# Patient Record
Sex: FEMALE
[De-identification: names, ages, dates, MRNs, and addresses within clinical notes are randomized per-mention and may not be internally consistent; named-entity substitution may affect disease eponyms.]

---

## 2018-08-07 ENCOUNTER — FORM ENCOUNTER (OUTPATIENT)
Age: 48
End: 2018-08-07

## 2018-08-07 PROBLEM — Z00.00 ENCOUNTER FOR PREVENTIVE HEALTH EXAMINATION: Status: ACTIVE | Noted: 2018-08-07

## 2018-08-08 ENCOUNTER — OUTPATIENT (OUTPATIENT)
Dept: OUTPATIENT SERVICES | Facility: HOSPITAL | Age: 48
LOS: 1 days | End: 2018-08-08
Payer: MEDICAID

## 2018-08-08 ENCOUNTER — APPOINTMENT (OUTPATIENT)
Dept: RADIOLOGY | Facility: CLINIC | Age: 48
End: 2018-08-08

## 2018-08-08 ENCOUNTER — APPOINTMENT (OUTPATIENT)
Dept: ORTHOPEDIC SURGERY | Facility: CLINIC | Age: 48
End: 2018-08-08
Payer: MEDICAID

## 2018-08-08 VITALS — RESPIRATION RATE: 16 BRPM | HEIGHT: 67 IN | WEIGHT: 158 LBS | BODY MASS INDEX: 24.8 KG/M2

## 2018-08-08 DIAGNOSIS — Z86.39 PERSONAL HISTORY OF OTHER ENDOCRINE, NUTRITIONAL AND METABOLIC DISEASE: ICD-10-CM

## 2018-08-08 DIAGNOSIS — Z87.891 PERSONAL HISTORY OF NICOTINE DEPENDENCE: ICD-10-CM

## 2018-08-08 DIAGNOSIS — Z80.9 FAMILY HISTORY OF MALIGNANT NEOPLASM, UNSPECIFIED: ICD-10-CM

## 2018-08-08 DIAGNOSIS — M20.12 HALLUX VALGUS (ACQUIRED), RIGHT FOOT: ICD-10-CM

## 2018-08-08 DIAGNOSIS — E55.9 VITAMIN D DEFICIENCY, UNSPECIFIED: ICD-10-CM

## 2018-08-08 DIAGNOSIS — M24.572 CONTRACTURE, LEFT ANKLE: ICD-10-CM

## 2018-08-08 DIAGNOSIS — M24.571 CONTRACTURE, RIGHT ANKLE: ICD-10-CM

## 2018-08-08 DIAGNOSIS — M20.11 HALLUX VALGUS (ACQUIRED), RIGHT FOOT: ICD-10-CM

## 2018-08-08 DIAGNOSIS — S92.514A NONDISPLACED FRACTURE OF PROXIMAL PHALANX OF RIGHT LESSER TOE(S), INITIAL ENCOUNTER FOR CLOSED FRACTURE: ICD-10-CM

## 2018-08-08 PROCEDURE — 99204 OFFICE O/P NEW MOD 45 MIN: CPT | Mod: 57

## 2018-08-08 PROCEDURE — 28510 TREATMENT OF TOE FRACTURE: CPT | Mod: RT

## 2018-08-08 PROCEDURE — 73630 X-RAY EXAM OF FOOT: CPT

## 2018-08-08 PROCEDURE — 73630 X-RAY EXAM OF FOOT: CPT | Mod: 26,RT

## 2018-08-08 RX ORDER — BETAMETHASONE VALERATE 1.2 MG/G
0.1 OINTMENT TOPICAL
Qty: 45 | Refills: 0 | Status: ACTIVE | COMMUNITY
Start: 2017-06-26

## 2018-08-08 RX ORDER — CHOLECALCIFEROL (VITAMIN D3) 1250 MCG
1.25 MG CAPSULE ORAL
Qty: 10 | Refills: 0 | Status: ACTIVE | COMMUNITY
Start: 2018-08-08 | End: 1900-01-01

## 2018-08-08 RX ORDER — METFORMIN ER 500 MG 500 MG/1
500 TABLET ORAL
Qty: 30 | Refills: 0 | Status: ACTIVE | COMMUNITY
Start: 2018-07-31

## 2018-10-11 ENCOUNTER — RX RENEWAL (OUTPATIENT)
Age: 48
End: 2018-10-11

## 2018-12-03 PROBLEM — M20.11 HALLUX VALGUS, BILATERAL: Status: ACTIVE | Noted: 2018-08-08

## 2024-08-27 ENCOUNTER — APPOINTMENT (OUTPATIENT)
Dept: ORTHOPEDIC SURGERY | Facility: CLINIC | Age: 54
End: 2024-08-27
Payer: MEDICAID

## 2024-08-27 VITALS
OXYGEN SATURATION: 99 % | HEIGHT: 67 IN | WEIGHT: 170 LBS | HEART RATE: 110 BPM | SYSTOLIC BLOOD PRESSURE: 184 MMHG | DIASTOLIC BLOOD PRESSURE: 118 MMHG | BODY MASS INDEX: 26.68 KG/M2

## 2024-08-27 DIAGNOSIS — M54.16 RADICULOPATHY, LUMBAR REGION: ICD-10-CM

## 2024-08-27 PROCEDURE — 72110 X-RAY EXAM L-2 SPINE 4/>VWS: CPT

## 2024-08-27 PROCEDURE — 99204 OFFICE O/P NEW MOD 45 MIN: CPT | Mod: 25

## 2024-08-27 RX ORDER — CYCLOBENZAPRINE HYDROCHLORIDE 5 MG/1
5 TABLET, FILM COATED ORAL
Qty: 30 | Refills: 0 | Status: ACTIVE | COMMUNITY
Start: 2024-08-27 | End: 1900-01-01

## 2024-08-27 RX ORDER — MELOXICAM 15 MG/1
15 TABLET ORAL DAILY
Qty: 30 | Refills: 0 | Status: ACTIVE | COMMUNITY
Start: 2024-08-27 | End: 1900-01-01

## 2024-08-29 ENCOUNTER — NON-APPOINTMENT (OUTPATIENT)
Age: 54
End: 2024-08-29

## 2024-08-29 RX ORDER — NABUMETONE 750 MG/1
750 TABLET, FILM COATED ORAL TWICE DAILY
Qty: 30 | Refills: 1 | Status: ACTIVE | COMMUNITY
Start: 2024-08-29 | End: 1900-01-01

## 2024-08-29 NOTE — PHYSICAL EXAM
[de-identified] : General: Well-nourished, well-developed, alert, and in no acute distress. Head: Normocephalic. Eyes: Pupils equal, extraocular muscles intact, normal sclera. Nose: No nasal discharge. Cardiovascular: Extremities are warm and well perfused. Distal pulses are symmetric bilaterally. Respiratory: No labored breathing. Extremities: Sensation is intact distally bilaterally. Distal pulses are symmetric bilaterally Lymphatic: No regional lymphadenopathy, no lymphedema Neurologic: No focal deficits Skin: Normal skin color, texture, and turgor Psychiatric: Normal affect  MSK: Examination of the Lumbar Spine: Gait normal Ambulating Able to toe walk, heel walk, tandem walk AROM: forward flexion limited due to pain, extension full and pain-free Tender to palpation: left paraspinals Nontender to palpation: midline, right paraspinals, SI jt, GTB, piriformis, gluteals   Lumbar Facet Loading [negative]     Right hip Range of Motion: Internal rotation: [25] degrees, External rotation: [80] degrees, Flexion [120] degrees     Log roll negative IRINA negative FADIR negative   SLR negative. Tight Hamstrings Piriformis compression negative ASIS distraction negative Iliac compression negative   Left hip:   Range of Motion: Internal rotation: [25] degrees, External rotation: [80] degrees, Flexion [120] degrees   Special tests: IRINA negative FADIR negative  SLR pain left buttocks. Tight Hamstrings Piriformis compression negative ASIS distraction negative Iliac compression negative     Sensation is intact to light touch over the superficial and deep peroneal nerve distributions and the posterior tibial nerve distribution. Capillary refill is less than two seconds. Posterior tibial and dorsalis pedis pulses 2+ equal bilaterally. No calf swelling or tenderness bilaterally. Strength testing shows Hip flexion 5/5, Hip adduction 5/5, Hip abduction 5/5, Knee Extension 5/5, Knee Flexion 5/5, dorsiflexion 5/5, plantar flexion 5/5, EHL 5/5 Reflexes: Patellar 2+, Achilles 2+. Babinski negative.      [de-identified] : Date: 08/27/2024 [place] [facility] [place]:  L-Spine Impression: There is no evidence of acute fracture.  Multilevel facet arthrosis.  Mild L2 retrolisthesis.  Multilevel anterior vertebral heights.  T12-L1 bridging osteophytes.

## 2024-08-29 NOTE — DISCUSSION/SUMMARY

## 2024-08-29 NOTE — DISCUSSION/SUMMARY
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
## 2024-08-29 NOTE — PHYSICAL EXAM
[de-identified] : General: Well-nourished, well-developed, alert, and in no acute distress. Head: Normocephalic. Eyes: Pupils equal, extraocular muscles intact, normal sclera. Nose: No nasal discharge. Cardiovascular: Extremities are warm and well perfused. Distal pulses are symmetric bilaterally. Respiratory: No labored breathing. Extremities: Sensation is intact distally bilaterally. Distal pulses are symmetric bilaterally Lymphatic: No regional lymphadenopathy, no lymphedema Neurologic: No focal deficits Skin: Normal skin color, texture, and turgor Psychiatric: Normal affect  MSK: Examination of the Lumbar Spine: Gait normal Ambulating Able to toe walk, heel walk, tandem walk AROM: forward flexion limited due to pain, extension full and pain-free Tender to palpation: left paraspinals Nontender to palpation: midline, right paraspinals, SI jt, GTB, piriformis, gluteals   Lumbar Facet Loading [negative]     Right hip Range of Motion: Internal rotation: [25] degrees, External rotation: [80] degrees, Flexion [120] degrees     Log roll negative IRINA negative FADIR negative   SLR negative. Tight Hamstrings Piriformis compression negative ASIS distraction negative Iliac compression negative   Left hip:   Range of Motion: Internal rotation: [25] degrees, External rotation: [80] degrees, Flexion [120] degrees   Special tests: IRINA negative FADIR negative  SLR pain left buttocks. Tight Hamstrings Piriformis compression negative ASIS distraction negative Iliac compression negative     Sensation is intact to light touch over the superficial and deep peroneal nerve distributions and the posterior tibial nerve distribution. Capillary refill is less than two seconds. Posterior tibial and dorsalis pedis pulses 2+ equal bilaterally. No calf swelling or tenderness bilaterally. Strength testing shows Hip flexion 5/5, Hip adduction 5/5, Hip abduction 5/5, Knee Extension 5/5, Knee Flexion 5/5, dorsiflexion 5/5, plantar flexion 5/5, EHL 5/5 Reflexes: Patellar 2+, Achilles 2+. Babinski negative.      [de-identified] : [place]24 [place] [facility] [place]  L-Spine Impression: There is no evidence of acute fracture.  Multilevel facet arthrosis.  Mild L2 retrolisthesis.  Multilevel anterior vertebral heights.  T12-L1 bridging osteophytes.

## 2024-08-29 NOTE — PHYSICAL EXAM
[de-identified] : General: Well-nourished, well-developed, alert, and in no acute distress. Head: Normocephalic. Eyes: Pupils equal, extraocular muscles intact, normal sclera. Nose: No nasal discharge. Cardiovascular: Extremities are warm and well perfused. Distal pulses are symmetric bilaterally. Respiratory: No labored breathing. Extremities: Sensation is intact distally bilaterally. Distal pulses are symmetric bilaterally Lymphatic: No regional lymphadenopathy, no lymphedema Neurologic: No focal deficits Skin: Normal skin color, texture, and turgor Psychiatric: Normal affect  MSK: Examination of the Lumbar Spine: Gait normal Ambulating Able to toe walk, heel walk, tandem walk AROM: forward flexion limited due to pain, extension full and pain-free Tender to palpation: left paraspinals Nontender to palpation: midline, right paraspinals, SI jt, GTB, piriformis, gluteals   Lumbar Facet Loading [negative]     Right hip Range of Motion: Internal rotation: [25] degrees, External rotation: [80] degrees, Flexion [120] degrees     Log roll negative IRINA negative FADIR negative   SLR negative. Tight Hamstrings Piriformis compression negative ASIS distraction negative Iliac compression negative   Left hip:   Range of Motion: Internal rotation: [25] degrees, External rotation: [80] degrees, Flexion [120] degrees   Special tests: IRINA negative FADIR negative  SLR pain left buttocks. Tight Hamstrings Piriformis compression negative ASIS distraction negative Iliac compression negative     Sensation is intact to light touch over the superficial and deep peroneal nerve distributions and the posterior tibial nerve distribution. Capillary refill is less than two seconds. Posterior tibial and dorsalis pedis pulses 2+ equal bilaterally. No calf swelling or tenderness bilaterally. Strength testing shows Hip flexion 5/5, Hip adduction 5/5, Hip abduction 5/5, Knee Extension 5/5, Knee Flexion 5/5, dorsiflexion 5/5, plantar flexion 5/5, EHL 5/5 Reflexes: Patellar 2+, Achilles 2+. Babinski negative.      [de-identified] : Date: 08/27/2024 Location: [facility] – [place] Body part:  L-Spine Impression: There is no evidence of acute fracture.  Multilevel facet arthrosis.  Mild L2 retrolisthesis.  Multilevel anterior vertebral heights.  T12-L1 bridging osteophytes.

## 2024-08-29 NOTE — ASSESSMENT
[FreeTextEntry1] : ROSY VENEGAS is a 54 year old female with low back pain. I discussed with the patient that their symptoms, signs, and imaging are most consistent with lumbar radicular pain. We reviewed the natural history of this condition and treatment options. We agreed on the following plan:   XR L spine as detailed above. Activity modification: low impact aerobic activity (stationary bike, elliptical, swimming). Recommend 150 min of moderate intensity aerobic activity per week. Start Home Exercises for spine conditioning. Demonstration, resistance bands and handout provided. Physical therapy. Referral provided. Medication: Meloxicam prn and Cyclobenzaprine prn prescription provided. Apply heat to low back when supine and knees flexed at 90 deg. Demonstrated provided. Advanced imaging: consider MRI if no symptomatic improvement. Follow up in 6-8 weeks.

## 2024-08-29 NOTE — DISCUSSION/SUMMARY

## 2024-11-08 ENCOUNTER — OFFICE (OUTPATIENT)
Dept: URBAN - METROPOLITAN AREA CLINIC 92 | Facility: CLINIC | Age: 54
Setting detail: OPHTHALMOLOGY
End: 2024-11-08
Payer: COMMERCIAL

## 2024-11-08 ENCOUNTER — RX ONLY (RX ONLY)
Age: 54
End: 2024-11-08

## 2024-11-08 DIAGNOSIS — H43.813: ICD-10-CM

## 2024-11-08 DIAGNOSIS — H21.233: ICD-10-CM

## 2024-11-08 DIAGNOSIS — H35.363: ICD-10-CM

## 2024-11-08 DIAGNOSIS — H25.13: ICD-10-CM

## 2024-11-08 DIAGNOSIS — H40.013: ICD-10-CM

## 2024-11-08 DIAGNOSIS — H10.45: ICD-10-CM

## 2024-11-08 PROBLEM — H43.811 POSTERIOR VITREOUS DETACHMENT; RIGHT EYE: Status: ACTIVE | Noted: 2024-11-08

## 2024-11-08 PROCEDURE — 92133 CPTRZD OPH DX IMG PST SGM ON: CPT | Performed by: SPECIALIST

## 2024-11-08 PROCEDURE — 92083 EXTENDED VISUAL FIELD XM: CPT | Performed by: SPECIALIST

## 2024-11-08 PROCEDURE — 92014 COMPRE OPH EXAM EST PT 1/>: CPT | Performed by: SPECIALIST

## 2024-11-08 ASSESSMENT — VISUAL ACUITY
OS_BCVA: 20/20
OD_BCVA: 20/40+2

## 2024-11-08 ASSESSMENT — REFRACTION_CURRENTRX
OD_OVR_VA: 20/
OD_AXIS: 147
OD_ADD: +1.75
OS_OVR_VA: 20/
OS_CYLINDER: -1.50
OS_ADD: +1.75
OS_SPHERE: -5.00
OD_CYLINDER: -1.25
OS_AXIS: 175
OD_SPHERE: -4.50

## 2024-11-08 ASSESSMENT — LID EXAM ASSESSMENTS
OD_MEIBOMITIS: RLL RUL 2+
OS_MEIBOMITIS: LLL LUL 2+

## 2024-11-08 ASSESSMENT — KERATOMETRY
OD_K2POWER_DIOPTERS: 45.50
METHOD_AUTO_MANUAL: AUTO
OS_AXISANGLE_DEGREES: 094
OD_K1POWER_DIOPTERS: 43.75
OS_K1POWER_DIOPTERS: 44.50
OS_K2POWER_DIOPTERS: 46.25
OD_AXISANGLE_DEGREES: 082

## 2024-11-08 ASSESSMENT — TEAR BREAK UP TIME (TBUT)
OS_TBUT: 8SEC
OD_TBUT: 8SEC

## 2024-11-08 ASSESSMENT — REFRACTION_MANIFEST
OS_CYLINDER: +1.50
OS_SPHERE: -6.25
OD_ADD: +1.75
OS_VA1: 20/20
OS_AXIS: 090
OD_SPHERE: -5.75
OS_ADD: +125
OD_ADD: +1.25
OD_CYLINDER: +0.75
OD_AXIS: 080
OS_CYLINDER: +1.25
OD_VA1: 20/20
OD_AXIS: 060
OS_SPHERE: -7.00
OD_CYLINDER: +1.25
OD_SPHERE: -5.75
OS_ADD: +1.75
OS_AXIS: 100

## 2024-11-08 ASSESSMENT — PACHYMETRY
OD_CT_UM: 526
OS_CT_UM: 520
OD_CT_CORRECTION: 1
OS_CT_CORRECTION: 1

## 2024-11-08 ASSESSMENT — REFRACTION_AUTOREFRACTION
OD_SPHERE: -6.00
OS_AXIS: 095
OS_CYLINDER: +2.00
OD_AXIS: 076
OS_SPHERE: -8.00
OD_CYLINDER: +1.50

## 2024-11-08 ASSESSMENT — TONOMETRY
OD_IOP_MMHG: 17
OS_IOP_MMHG: 18

## 2024-11-08 ASSESSMENT — SUPERFICIAL PUNCTATE KERATITIS (SPK)
OS_SPK: T 1+
OD_SPK: T 1+

## 2025-03-17 ASSESSMENT — REFRACTION_CURRENTRX
OD_OVR_VA: 20/
OD_ADD: +1.75
OS_SPHERE: -5.00
OD_SPHERE: -4.50
OS_AXIS: 175
OS_CYLINDER: -1.50
OS_ADD: +1.75
OD_CYLINDER: -1.25
OD_AXIS: 147
OS_OVR_VA: 20/

## 2025-03-17 ASSESSMENT — REFRACTION_MANIFEST
OD_CYLINDER: +0.75
OD_SPHERE: -5.75
OS_VA1: 20/20
OS_SPHERE: -6.25
OD_CYLINDER: +1.25
OD_ADD: +1.25
OD_AXIS: 080
OS_CYLINDER: +1.50
OS_AXIS: 090
OD_VA1: 20/20
OS_CYLINDER: +1.25
OD_ADD: +1.75
OS_ADD: +125
OS_SPHERE: -7.00
OS_AXIS: 100
OS_ADD: +1.75
OD_SPHERE: -5.75
OD_AXIS: 060

## 2025-03-17 ASSESSMENT — KERATOMETRY
OS_K2POWER_DIOPTERS: 46.25
OD_K2POWER_DIOPTERS: 45.50
OS_AXISANGLE_DEGREES: 094
METHOD_AUTO_MANUAL: AUTO
OD_K1POWER_DIOPTERS: 43.75
OD_AXISANGLE_DEGREES: 082
OS_K1POWER_DIOPTERS: 44.50